# Patient Record
Sex: MALE | Race: WHITE | Employment: FULL TIME | URBAN - METROPOLITAN AREA
[De-identification: names, ages, dates, MRNs, and addresses within clinical notes are randomized per-mention and may not be internally consistent; named-entity substitution may affect disease eponyms.]

---

## 2022-05-24 ENCOUNTER — HOSPITAL ENCOUNTER (OUTPATIENT)
Dept: MRI IMAGING | Age: 62
Discharge: HOME | End: 2022-05-27
Payer: COMMERCIAL

## 2022-05-24 DIAGNOSIS — S83.249D TEAR OF MEDIAL MENISCUS OF KNEE, CURRENT, UNSPECIFIED LATERALITY, UNSPECIFIED TEAR TYPE, SUBSEQUENT ENCOUNTER: ICD-10-CM

## 2022-05-24 PROCEDURE — 73721 MRI JNT OF LWR EXTRE W/O DYE: CPT

## 2024-03-20 ENCOUNTER — OFFICE VISIT (OUTPATIENT)
Dept: ORTHOPEDIC SURGERY | Age: 64
End: 2024-03-20

## 2024-03-20 VITALS — WEIGHT: 250 LBS | HEIGHT: 76 IN | BODY MASS INDEX: 30.44 KG/M2

## 2024-03-20 DIAGNOSIS — S83.91XA SPRAIN OF RIGHT KNEE, UNSPECIFIED LIGAMENT, INITIAL ENCOUNTER: Primary | ICD-10-CM

## 2024-03-20 DIAGNOSIS — M17.11 PRIMARY OSTEOARTHRITIS OF RIGHT KNEE: ICD-10-CM

## 2024-03-20 DIAGNOSIS — S83.231A COMPLEX TEAR OF MEDIAL MENISCUS, CURRENT INJURY, RIGHT KNEE, INITIAL ENCOUNTER: ICD-10-CM

## 2024-03-20 RX ORDER — DICLOFENAC SODIUM 75 MG/1
75 TABLET, DELAYED RELEASE ORAL 2 TIMES DAILY
Qty: 60 TABLET | Refills: 0 | Status: SHIPPED | OUTPATIENT
Start: 2024-03-20 | End: 2024-04-19

## 2024-03-20 RX ORDER — METHYLPREDNISOLONE ACETATE 80 MG/ML
80 INJECTION, SUSPENSION INTRA-ARTICULAR; INTRALESIONAL; INTRAMUSCULAR; SOFT TISSUE ONCE
Status: COMPLETED | OUTPATIENT
Start: 2024-03-20 | End: 2024-03-20

## 2024-03-20 RX ORDER — LEVOTHYROXINE SODIUM 112 UG/1
112 TABLET ORAL DAILY
COMMUNITY
Start: 2023-12-13 | End: 2024-12-12

## 2024-03-20 RX ADMIN — METHYLPREDNISOLONE ACETATE 80 MG: 80 INJECTION, SUSPENSION INTRA-ARTICULAR; INTRALESIONAL; INTRAMUSCULAR; SOFT TISSUE at 09:02

## 2024-03-20 NOTE — PROGRESS NOTES
Name: Kevin Stone  YOB: 1960  Gender: male  MRN: [id number]      What: Right knee pain  How: Complicated history originally related to a pickleball injury 3 years ago but exacerbated by playing racquet sports 1 month ago      HPI: Kevin Stone is a 63 y.o. male seen for right knee problems.  He has a history of hypothyroidism.  No left knee problems.  He has a complex history regarding his right knee.  He initially injured his right knee 3 years ago playing pickle ball.  He was evaluated by Dr. Smith.  MRI of the right knee at that time demonstrated a medial meniscal tear right knee and patellofemoral chondromalacia right knee he was injected in the right knee did well.  He had intermittent pain.  Roughly 1 month ago he played racquet sports with his son-in-law who is very competitive.  Following that game he noted severe onset right knee pain.  His right knee is sore painful stiff.  He cannot fully extend it.  It hurts all the time.  It affects his quality of life.      ROS/Meds/PSH/PMH/FH/SH: A ten system review of systems was performed and is negative other than what is in the HPI.   Tobacco:  reports that he has never smoked. He has never used smokeless tobacco.  Ht 1.93 m (6' 4\")   Wt 113.4 kg (250 lb)   BMI 30.43 kg/m²      Physical Examination:  He is an awake alert pleasant gentleman ambulating with an antalgic gait    The left knee has a range of motion of 0 to 135 degrees  negative Lachman,  negative anterior drawer,   negative posterior drawer  negative pivot.   Good tibial step-off,   No varus or valgus laxity at 0 or 30 degrees.   Negative lateral joint line tenderness   negative lateral Jean Carlos.   Negative medial joint line tenderness  negative medial Jean Carlos.   No evidence of any posterolateral instability.   No patellofemoral pain.   Negative compression,   negative apprehension  no effusion.   Calves Are non-tender,  neurovascularly patient is intact.   Negative Homans.

## 2024-03-27 DIAGNOSIS — S83.91XA SPRAIN OF RIGHT KNEE, UNSPECIFIED LIGAMENT, INITIAL ENCOUNTER: ICD-10-CM

## 2024-04-02 ENCOUNTER — OFFICE VISIT (OUTPATIENT)
Dept: ORTHOPEDIC SURGERY | Age: 64
End: 2024-04-02

## 2024-04-02 DIAGNOSIS — M17.11 PRIMARY OSTEOARTHRITIS OF RIGHT KNEE: ICD-10-CM

## 2024-04-02 DIAGNOSIS — Q74.1 BIPARTITE PATELLA: ICD-10-CM

## 2024-04-02 DIAGNOSIS — S83.231D COMPLEX TEAR OF MEDIAL MENISCUS OF RIGHT KNEE AS CURRENT INJURY, SUBSEQUENT ENCOUNTER: Primary | ICD-10-CM

## 2024-04-02 DIAGNOSIS — M67.51 PLICA SYNDROME OF RIGHT KNEE: ICD-10-CM

## 2024-04-02 NOTE — PROGRESS NOTES
Patient Can fully extend the knee.   Good quad tone  No erythema.   Negative Dial test.      The right knee has a range of motion of 5 to 125 degrees  negative Lachman,  negative anterior drawer,   negative posterior drawer  negative pivot.   Good tibial step-off,   No varus or valgus laxity at 0 or 30 degrees.   Negative lateral joint line tenderness   negative lateral Jean Carlos.   Positive medial joint line tenderness  5 medial Jean Carlos.   No evidence of any posterolateral instability.   No patellofemoral pain.   Negative compression,   negative apprehension  no effusion.   Calves Are non-tender,  neurovascularly patient is intact.   Negative Homans.   MPFL is non-tender.   Patient Can fully extend the knee.   Good quad tone  No erythema.   Negative Dial test.          Data Reviewed:    MRI right knee report only dated 3/27/2024 demonstrates the ACL PCL lateral meniscus to be intact.  There is a tear of the posterior horn of the medial meniscus.  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  There is a medial plica.  There is evidence of patellofemoral osteoarthritis.        MRI right knee dated 5/20/2022 demonstrates the ACL PCL and lateral meniscus to be intact.  There is a nondisplaced tear of the posterior horn of the medial meniscus.  There is patellofemoral chondromalacia       Minor Procedure:    OFFICE PROCEDURE PROGRESS NOTE      Chart reviewed for the following:   SYD MANTILLA JR, MD, have reviewed the History, Physical and updated the Allergic reactions for Kevin Stone     TIME OUT performed immediately prior to start of procedure:   SYD MANTILLA JR, MD, have performed the following reviews on Kevin Stone prior to the start of the procedure:            * Patient was identified by name and date of birth   * Agreement on procedure being performed was verified  * Risks and Benefits explained to the patient  * Procedure site verified and marked as necessary  * Patient was positioned for comfort     Time: 10:48

## 2024-05-13 ENCOUNTER — TELEPHONE (OUTPATIENT)
Dept: ORTHOPEDIC SURGERY | Age: 64
End: 2024-05-13

## 2024-05-13 NOTE — TELEPHONE ENCOUNTER
Called and left voicemail for patient, he can repeat durolane on 10-2-24 or after. Patient will need to schedule appointment.

## 2024-07-08 ENCOUNTER — TELEPHONE (OUTPATIENT)
Dept: ORTHOPEDIC SURGERY | Age: 64
End: 2024-07-08

## 2024-07-08 NOTE — TELEPHONE ENCOUNTER
Can  pt  be  seen  sooner than August?    He  is  using  a  walking stick   and having a lot  of  pain    At his mri  appt  surgery  was  discussed   the injections  have  worn off

## 2024-07-29 ENCOUNTER — OFFICE VISIT (OUTPATIENT)
Dept: ORTHOPEDIC SURGERY | Age: 64
End: 2024-07-29
Payer: COMMERCIAL

## 2024-07-29 DIAGNOSIS — S83.231D COMPLEX TEAR OF MEDIAL MENISCUS OF RIGHT KNEE AS CURRENT INJURY, SUBSEQUENT ENCOUNTER: ICD-10-CM

## 2024-07-29 DIAGNOSIS — Q74.1 BIPARTITE PATELLA: ICD-10-CM

## 2024-07-29 DIAGNOSIS — M17.11 PRIMARY OSTEOARTHRITIS OF RIGHT KNEE: Primary | ICD-10-CM

## 2024-07-29 DIAGNOSIS — M67.51 PLICA SYNDROME OF RIGHT KNEE: ICD-10-CM

## 2024-07-29 PROCEDURE — 20610 DRAIN/INJ JOINT/BURSA W/O US: CPT | Performed by: ORTHOPAEDIC SURGERY

## 2024-07-29 RX ORDER — METHYLPREDNISOLONE ACETATE 80 MG/ML
80 INJECTION, SUSPENSION INTRA-ARTICULAR; INTRALESIONAL; INTRAMUSCULAR; SOFT TISSUE ONCE
Status: COMPLETED | OUTPATIENT
Start: 2024-07-29 | End: 2024-07-29

## 2024-07-29 RX ADMIN — METHYLPREDNISOLONE ACETATE 80 MG: 80 INJECTION, SUSPENSION INTRA-ARTICULAR; INTRALESIONAL; INTRAMUSCULAR; SOFT TISSUE at 16:38

## 2024-07-29 NOTE — PROGRESS NOTES
was verified  * Risks and Benefits explained to the patient  * Procedure site verified and marked as necessary  * Patient was positioned for comfort     Time: 8:53 AM  Date of procedure: 7/29/2024  Procedure performed by:  SYD LAWSON JR, MD    After sterile prep and drape of the right knee, I aspirated 5 cc of joint fluid the patient received a 9:1 injection into the right knee.   It consisted of 4.5 mL of 2% Xylocaine, 4.5 mL of 0.5% bupivacaine and 80 mg of Depo-Medrol.  A sterile dressing was applied. The patient tolerated the procedure well.       Impression:   1. Primary osteoarthritis of right knee    2. Complex tear of medial meniscus of right knee as current injury, subsequent encounter    3. Plica syndrome of right knee    4. Bipartite patella       Rule out internal derangement right knee  Hypothyroidism    Plan:   I injected his right knee with cortisone today.  I will recheck him back in 3 months for possible repeat Durolane viscosupplementation right knee.  If he has problems in the interim we may consider an arthroscopy of the right knee    Follow up: Return in about 3 months (around 10/15/2024).             SYD LAWSON JR, MD

## 2024-08-06 DIAGNOSIS — M17.11 PRIMARY OSTEOARTHRITIS OF RIGHT KNEE: Primary | ICD-10-CM

## 2024-09-26 ENCOUNTER — TELEPHONE (OUTPATIENT)
Dept: UROLOGY | Age: 64
End: 2024-09-26

## 2024-11-06 ENCOUNTER — OFFICE VISIT (OUTPATIENT)
Dept: UROLOGY | Age: 64
End: 2024-11-06

## 2024-11-06 DIAGNOSIS — N40.1 BPH WITH OBSTRUCTION/LOWER URINARY TRACT SYMPTOMS: ICD-10-CM

## 2024-11-06 DIAGNOSIS — N13.8 BPH WITH OBSTRUCTION/LOWER URINARY TRACT SYMPTOMS: ICD-10-CM

## 2024-11-06 DIAGNOSIS — R97.20 ELEVATED PSA: Primary | ICD-10-CM

## 2024-11-06 LAB
BILIRUBIN, URINE, POC: NEGATIVE
BLOOD URINE, POC: NORMAL
GLUCOSE URINE, POC: NEGATIVE MG/DL
KETONES, URINE, POC: NEGATIVE MG/DL
LEUKOCYTE ESTERASE, URINE, POC: NORMAL
NITRITE, URINE, POC: NEGATIVE
PH, URINE, POC: 7 (ref 4.6–8)
PROTEIN,URINE, POC: NEGATIVE MG/DL
SPECIFIC GRAVITY, URINE, POC: 1.02 (ref 1–1.03)
URINALYSIS CLARITY, POC: NORMAL
URINALYSIS COLOR, POC: NORMAL
UROBILINOGEN, POC: NORMAL MG/DL

## 2024-11-06 ASSESSMENT — ENCOUNTER SYMPTOMS
NAUSEA: 0
BACK PAIN: 0

## 2024-11-06 NOTE — PROGRESS NOTES
[facility]  [address] [address]  [phone]    Kevin Stone  : 1960    Chief Complaint   Patient presents with    New Patient        HPI     Kevin Stone is a 64 y.o. male   Referred for evaluation and treatment of elevated PSA. PSA 8.1.  PSA was 4.5 in , 6.5 in , 4.6 in .   He has c/o weak stream, straining, urgency, nocturia x 2, incomplete emptying   No family history of prostate cancer.   He is healthy, plays [employer] ball.   No past medical history on file.  No past surgical history on file.  Current Outpatient Medications   Medication Sig Dispense Refill    levothyroxine (SYNTHROID) 112 MCG tablet Take 1 tablet by mouth daily      diclofenac (VOLTAREN) 75 MG EC tablet Take 1 tablet by mouth 2 times daily 60 tablet 0    diclofenac sodium (VOLTAREN) 1 % GEL Apply 2 g topically 2 times daily as needed for Pain (Patient not taking: Reported on 2024) 100 g 2     Current Facility-Administered Medications   Medication Dose Route Frequency Provider Last Rate Last Admin    sodium hyaluronate (DUROLANE) injection PRSY 60 mg  60 mg Intra-artICUlar Once Posta, Scottie TINEO Jr., MD         No Known Allergies  Social History     Socioeconomic History    Marital status:      Spouse name: Not on file    Number of children: Not on file    Years of education: Not on file    Highest education level: Not on file   Occupational History    Not on file   Tobacco Use    Smoking status: Never    Smokeless tobacco: Never   Substance and Sexual Activity    Alcohol use: Not on file    Drug use: Not on file    Sexual activity: Not on file   Other Topics Concern    Not on file   Social History Narrative    Not on file     Social Determinants of Health     Financial Resource Strain: Low Risk  (2023)    Received from [facility]    Financial Resource Strain     Difficulty Paying Living Expenses: Not very hard     Difficulty Paying Medical

## 2024-11-14 ENCOUNTER — LAB (OUTPATIENT)
Dept: UROLOGY | Age: 64
End: 2024-11-14

## 2024-11-14 DIAGNOSIS — R97.20 ELEVATED PSA: ICD-10-CM

## 2024-11-14 LAB — PSA SERPL-MCNC: 7.4 NG/ML (ref 0–4)

## 2024-11-19 ENCOUNTER — TELEPHONE (OUTPATIENT)
Dept: UROLOGY | Age: 64
End: 2024-11-19

## 2024-11-19 DIAGNOSIS — R97.20 ELEVATED PSA: Primary | ICD-10-CM

## 2024-11-19 NOTE — TELEPHONE ENCOUNTER
The patient is asking to have MRI orders sent to [facility] on [address] in [place] for insurance purposes.

## 2024-11-19 NOTE — TELEPHONE ENCOUNTER
Repeat PSA is 7.4  I called pt, left VM. I recommend MRI.   Diagnosis Orders   1. Elevated PSA  MRI PELVIS W WO CONTRAST        Call patient with results

## 2024-12-13 ENCOUNTER — PATIENT MESSAGE (OUTPATIENT)
Dept: UROLOGY | Age: 64
End: 2024-12-13

## 2024-12-16 ENCOUNTER — TELEPHONE (OUTPATIENT)
Dept: UROLOGY | Age: 64
End: 2024-12-16

## 2024-12-16 NOTE — TELEPHONE ENCOUNTER
The pt LVM about the prostate MRI he had done one 12/12. The patient uploaded an image in his chart that is instructions on how Anatoliy can view the results of the MRI. The patient wanted to know if this was okay before he came for his visit on Wednesday.

## 2024-12-18 ENCOUNTER — OFFICE VISIT (OUTPATIENT)
Dept: UROLOGY | Age: 64
End: 2024-12-18
Payer: COMMERCIAL

## 2024-12-18 DIAGNOSIS — N13.8 BPH WITH OBSTRUCTION/LOWER URINARY TRACT SYMPTOMS: ICD-10-CM

## 2024-12-18 DIAGNOSIS — N40.1 BPH WITH OBSTRUCTION/LOWER URINARY TRACT SYMPTOMS: ICD-10-CM

## 2024-12-18 DIAGNOSIS — R97.20 ELEVATED PSA: Primary | ICD-10-CM

## 2024-12-18 PROCEDURE — 99214 OFFICE O/P EST MOD 30 MIN: CPT | Performed by: UROLOGY

## 2024-12-18 RX ORDER — TAMSULOSIN HYDROCHLORIDE 0.4 MG/1
0.4 CAPSULE ORAL DAILY
Qty: 30 CAPSULE | Refills: 12 | Status: SHIPPED | OUTPATIENT
Start: 2024-12-18

## 2024-12-18 ASSESSMENT — ENCOUNTER SYMPTOMS
BACK PAIN: 0
NAUSEA: 0

## 2024-12-18 NOTE — PROGRESS NOTES
[facility]  [address] [place] [id number]  [phone]    Kevin Stone  : 1960    Chief Complaint   Patient presents with    Follow-up        HPI     Kevin Stone is a 64 y.o. male   Referred for evaluation and treatment of elevated PSA. PSA 8.1.  PSA was 4.5 in , 6.5 in , 4.6 in .   He has c/o weak stream, straining, urgency, nocturia x 2, incomplete emptying   No family history of prostate cancer.   He is healthy, plays [employer] ball.   PSA 7.4 in .   MRI in [place] shows 157 gm prostate, 1.2 cm PIRADS 3 lesion right apical peripheral zone.     No past medical history on file.  No past surgical history on file.  Current Outpatient Medications   Medication Sig Dispense Refill    tamsulosin (FLOMAX) 0.4 MG capsule Take 1 capsule by mouth daily 30 capsule 12    levothyroxine (SYNTHROID) 112 MCG tablet Take 1 tablet by mouth daily      diclofenac (VOLTAREN) 75 MG EC tablet Take 1 tablet by mouth 2 times daily 60 tablet 0    diclofenac sodium (VOLTAREN) 1 % GEL Apply 2 g topically 2 times daily as needed for Pain (Patient not taking: Reported on 2024) 100 g 2     Current Facility-Administered Medications   Medication Dose Route Frequency Provider Last Rate Last Admin    sodium hyaluronate (DUROLANE) injection PRSY 60 mg  60 mg Intra-artICUlar Once Posta, Scottie TINEO Jr., MD         No Known Allergies  Social History     Socioeconomic History    Marital status:      Spouse name: Not on file    Number of children: Not on file    Years of education: Not on file    Highest education level: Not on file   Occupational History    Not on file   Tobacco Use    Smoking status: Never    Smokeless tobacco: Never   Substance and Sexual Activity    Alcohol use: Not on file    Drug use: Not on file    Sexual activity: Not on file   Other Topics Concern    Not on file   Social History Narrative    Not on file     Social Determinants of Health     Financial Resource Strain: